# Patient Record
Sex: FEMALE | Race: WHITE | URBAN - METROPOLITAN AREA
[De-identification: names, ages, dates, MRNs, and addresses within clinical notes are randomized per-mention and may not be internally consistent; named-entity substitution may affect disease eponyms.]

---

## 2023-06-21 ENCOUNTER — OFFICE VISIT (OUTPATIENT)
Dept: URBAN - METROPOLITAN AREA CLINIC 118 | Facility: CLINIC | Age: 43
End: 2023-06-21
Payer: COMMERCIAL

## 2023-06-21 ENCOUNTER — DASHBOARD ENCOUNTERS (OUTPATIENT)
Age: 43
End: 2023-06-21

## 2023-06-21 ENCOUNTER — WEB ENCOUNTER (OUTPATIENT)
Dept: URBAN - METROPOLITAN AREA CLINIC 118 | Facility: CLINIC | Age: 43
End: 2023-06-21

## 2023-06-21 VITALS
HEIGHT: 64 IN | BODY MASS INDEX: 33.8 KG/M2 | SYSTOLIC BLOOD PRESSURE: 140 MMHG | HEART RATE: 68 BPM | TEMPERATURE: 98.1 F | WEIGHT: 198 LBS | DIASTOLIC BLOOD PRESSURE: 79 MMHG

## 2023-06-21 DIAGNOSIS — R06.02 SHORTNESS OF BREATH: ICD-10-CM

## 2023-06-21 DIAGNOSIS — R00.2 HEART PALPITATIONS: ICD-10-CM

## 2023-06-21 PROCEDURE — 99203 OFFICE O/P NEW LOW 30 MIN: CPT | Performed by: INTERNAL MEDICINE

## 2023-06-21 RX ORDER — IBUPROFEN 600 MG/1
TABLET, FILM COATED ORAL
Qty: 20 TABLET | Status: ACTIVE | COMMUNITY

## 2023-06-21 RX ORDER — AMLODIPINE BESYLATE 5 MG/1
TABLET ORAL
Qty: 30 TABLET | Status: ACTIVE | COMMUNITY

## 2023-06-21 RX ORDER — ERGOCALCIFEROL 1.25 MG/1
CAPSULE, LIQUID FILLED ORAL
Qty: 4 CAPSULE | Status: ACTIVE | COMMUNITY

## 2023-06-21 RX ORDER — ACETAMINOPHEN 500 MG/1
TABLET, FILM COATED ORAL
Qty: 20 TABLET | Status: ACTIVE | COMMUNITY

## 2023-06-21 RX ORDER — PANTOPRAZOLE SODIUM 40 MG/1
TABLET, DELAYED RELEASE ORAL
Qty: 30 TABLET | Status: ACTIVE | COMMUNITY

## 2023-06-21 NOTE — HPI-TODAY'S VISIT:
intermittant heart palpitations, sob, worse with eating especially unhealthy food reports finishing up cardiac workup, turing in heart monitor she's wearing on friday  on pantoprazole for 2 weeks she reports a little help with heart palpitations, but stopped due to reading the side effects